# Patient Record
Sex: MALE | URBAN - METROPOLITAN AREA
[De-identification: names, ages, dates, MRNs, and addresses within clinical notes are randomized per-mention and may not be internally consistent; named-entity substitution may affect disease eponyms.]

---

## 2023-02-13 ENCOUNTER — TELEPHONE (OUTPATIENT)
Dept: CARDIOLOGY CLINIC | Age: 23
End: 2023-02-13

## 2023-02-13 NOTE — TELEPHONE ENCOUNTER
New Patient Referral    Referring Provider Gypsy Sesay    Phone Number:[phone]  Fax Number:[phone]  Address: [address] [place] [address], [address] [address] [facility]    Diagnosis/Reason for Visit:Heart palpitations    Cardiac Clearance? Cardiac Testing: (Yes/No/Unsure) Unsure    Date testing was completed?___________      Have records been requested? (Yes/No)    Preferred Language:______English_________    needed? (Yes/No)    Patient needs [place] appointment soon.

## 2023-02-15 NOTE — TELEPHONE ENCOUNTER
Phoned pt lvm to cb and schedule new pt. Appt. DKW 02/17/23 at 2:30 in [address] [place] [address].

## 2023-02-22 ENCOUNTER — TELEPHONE (OUTPATIENT)
Dept: CARDIOLOGY CLINIC | Age: 23
End: 2023-02-22

## 2023-02-22 NOTE — TELEPHONE ENCOUNTER
Phoned pt lvm final attempt to sched. , to cb if they would like to sched. Home number/mobil # no answer. Phoned ref prov. Unable to reach.

## 2023-02-22 NOTE — TELEPHONE ENCOUNTER
Several appointments have been offered to patient without successful scheduling. Patient wishes to be seen by  at [facility] location. Please advise. Phoned pt lvm  to cb to schedule appt.,home #,mobil no answer final attempt to schedule pt. . Phoned ref provider unable to reach.